# Patient Record
Sex: FEMALE | Race: WHITE | NOT HISPANIC OR LATINO | Employment: UNEMPLOYED | URBAN - METROPOLITAN AREA
[De-identification: names, ages, dates, MRNs, and addresses within clinical notes are randomized per-mention and may not be internally consistent; named-entity substitution may affect disease eponyms.]

---

## 2019-06-29 ENCOUNTER — TRANSFERRED RECORDS (OUTPATIENT)
Dept: HEALTH INFORMATION MANAGEMENT | Facility: CLINIC | Age: 54
End: 2019-06-29

## 2019-07-11 ENCOUNTER — TELEPHONE (OUTPATIENT)
Dept: FAMILY MEDICINE | Facility: OTHER | Age: 54
End: 2019-07-11

## 2019-07-11 NOTE — TELEPHONE ENCOUNTER
She no showed today and has never been seen in our clinic.  She needs an appointment to get medications.     Electronically signed by Fatoumata Andrade CNP.

## 2019-07-11 NOTE — TELEPHONE ENCOUNTER
Patient has appt with you on 7/19 but needs more cyclobenzaprine 10mg bid prn muscle spasms to carry her through.    She has 5 left so #15 would suffice until you see her. Rather than try to track down the [facility] who prescribed it, would you consider giving her another 15?     Les Ag [facility], [facility] [phone]

## 2019-07-19 ENCOUNTER — OFFICE VISIT (OUTPATIENT)
Dept: FAMILY MEDICINE | Facility: OTHER | Age: 54
End: 2019-07-19

## 2019-07-19 VITALS
RESPIRATION RATE: 16 BRPM | TEMPERATURE: 97.7 F | HEART RATE: 82 BPM | DIASTOLIC BLOOD PRESSURE: 66 MMHG | SYSTOLIC BLOOD PRESSURE: 96 MMHG | OXYGEN SATURATION: 97 % | BODY MASS INDEX: 32.07 KG/M2 | HEIGHT: 63 IN | WEIGHT: 181 LBS

## 2019-07-19 DIAGNOSIS — R41.3 MEMORY LOSS: Primary | ICD-10-CM

## 2019-07-19 DIAGNOSIS — M54.9 UPPER BACK PAIN: ICD-10-CM

## 2019-07-19 DIAGNOSIS — R47.01 EXPRESSIVE APHASIA: ICD-10-CM

## 2019-07-19 PROCEDURE — 99204 OFFICE O/P NEW MOD 45 MIN: CPT | Performed by: NURSE PRACTITIONER

## 2019-07-19 ASSESSMENT — MIFFLIN-ST. JEOR: SCORE: 1372.2

## 2019-07-19 ASSESSMENT — PAIN SCALES - GENERAL: PAINLEVEL: NO PAIN (0)

## 2019-07-19 NOTE — PATIENT INSTRUCTIONS
Schedule a physical when you get your insurance set up    See the Neurologist as planned.     Get the records sent to us about your back.     The neurologist appointment:      Stefany Morgan APRN,CNP    Nurse Practitioner    [address] [place] [id number]         Phone: [phone]    Fax: [phone]

## 2019-07-19 NOTE — PROGRESS NOTES
Subjective     Jen Corado is a 54 year old female who presents to clinic today for the following health issues:    HPI       Hospital Follow-up Visit:    Hospital/Nursing Home/IP Rehab Facility: [facility]  Date of Admission: 6/27/19  Date of Discharge: 6/29/19  Reason(s) for Admission: chest pain, forgetful, feeling strange            Problems taking medications regularly:  None       Medication changes since discharge: None       Problems adhering to non-medication therapy:  None    Summary of hospitalization:  CareEverywhere information obtained and reviewed  Diagnostic Tests/Treatments reviewed.  Follow up needed: Neurology  Other Healthcare Providers Involved in Patient s Care:         None  Update since discharge: stable.     Post Discharge Medication Reconciliation: discharge medications reconciled, continue medications without change.  Plan of care communicated with patient     Coding guidelines for this visit:  Type of Medical   Decision Making Face-to-Face Visit       within 7 Days of discharge Face-to-Face Visit        within 14 days of discharge   Moderate Complexity 53511 [id number]   High Complexity [id number] [id number]          Excerpt from Discharge Summary:      Discharge Summaries    Juan Carrillo MD - 06/29/2019 9:51 AM CDT  Formatting of this note might be different from the original.  INPATIENT DISCHARGE SUMMARY  [facility]    Attending Provider: Juan Carrillo MD   Primary Care Physician at Discharge: No Primary Provider None     Admission Date: 06/27/2019  Discharge Date: 06/29/2019  HOSPITAL SUMMARY   Discharge Diagnosis  Chest Pain, ACS ruled out  Dementia vs psychiatric disorder NOS  Nicotine Abuse  Obesity - Body mass index is 32.1 kg/m .     Hospital Course   Ms. Corado is a 53 year old female with a history of nicotine abuse who was admitted to [facility] for some intermittent chest pressure. EKG and troponins were negative for ischemic findings. Cardiology had  been consulted. She was to undergo CTCA, but then got nervous about needing nitroglycerin, and preferred a stress test. Then when talking to her the next day in preparation for her stress test, she was nervous about the radiation. She eventually agreed to undergo CTCA after further discussion. This was negative for any significant disease.     After talking to her and family and her, there is some concern for progressive neurological vs psychiatric disorder. She has been unable to work full time over the prior two years, goes on tangents easily during conversation, has been getting lost while driving, leaving water on at her house, and other concerning signs. Her personality had also changed. Both neurology and psychiatry were consulted here. Initial workup was normal, though she does need ongoing follow-up, including continued work with OT. A driving assessment was recommended in my outpatient OT order as well.     The patient has little insight into there being any issue, and there is a chance she is lost to follow-up, but follow-up was encouraged.     Kind Regards,   Juan Carrillo MD    After this hospitalization, she went on vacation in [place] and ended up being seen in an Urgent Care there.  This was for upper back pain and muscle cramps.  She spends most of the visit talking about this.  Her thought process is quite tangential and it is difficult to get an accurate history.  It sounds like she had imaging done and was prescribed Flexeril.  I do not have access to any of these records.  The Flexeril was helpful and her symptoms have resolved.  However she is very concerned this may happen again and she has several questions about what caused this, why did it happen, ect.     Today she tells me she started having issues at work about 2 months ago.  She attributes this to an unreasonable boss and difficult working situations.  She eventually had to leave her job due to her difficulties.  She attributes  "her recent difficulties with memory to stress related to losing her job.  She denies any other neurological symptoms, but while she was hospitalized, family had raised concerns.  Her  is with her today.  He also attributes most of her issues to her job difficulties.       Has not been to a doctor in many years and she now is working on getting insurance coverage.      Review of Systems   ROS COMP: Constitutional, HEENT, cardiovascular, pulmonary, GI, , musculoskeletal, neuro, skin, endocrine and psych systems are negative, except as otherwise noted.      Objective    BP 96/66   Pulse 82   Temp 97.7  F (36.5  C) (Temporal)   Resp 16   Ht 1.588 m (5' 2.5\")   Wt 82.1 kg (181 lb)   SpO2 97%   BMI 32.58 kg/m    Body mass index is 32.58 kg/m .  Physical Exam   GENERAL: healthy, alert and no distress  EYES: Eyes grossly normal to inspection, PERRL and conjunctivae and sclerae normal  HENT: ear canals and TM's normal, nose and mouth without ulcers or lesions  NECK: no adenopathy, no asymmetry, masses, or scars and thyroid normal to palpation  RESP: lungs clear to auscultation - no rales, rhonchi or wheezes  CV: regular rate and rhythm, normal S1 S2, no S3 or S4, no murmur, click or rub, no peripheral edema and peripheral pulses strong  ABDOMEN: soft, nontender, no hepatosplenomegaly, no masses and bowel sounds normal  MS: no gross musculoskeletal defects noted, no edema  NEURO: Normal strength and tone, mentation intact and speech normal  PSYCH: concentration poor, tangential, anxious and appearance well groomed    Diagnostic Test Results:  none         Assessment & Plan     1. Memory loss  2. Expressive aphasia  She already has a follow up appointment set up with Neurology next month.  Will keep that.  She is very tangential and difficult to follow at times.  I suspect a neurological disorder.         3. Upper back pain  She is very concerned about this, although all her symptoms have resolved now.  I " "advised she get her records sent and we can review them, but unless her symptoms come back it is likely nothing to worry about.         BMI:   Estimated body mass index is 32.58 kg/m  as calculated from the following:    Height as of this encounter: 1.588 m (5' 2.5\").    Weight as of this encounter: 82.1 kg (181 lb).   Weight management plan: Patient was referred to their PCP to discuss a diet and exercise plan.        See Patient Instructions    Return in about 27 days (around 8/15/2019) for Neurology appointment .    KENDELL Trejo [facility]    "

## 2019-08-30 ENCOUNTER — HOSPITAL ENCOUNTER (EMERGENCY)
Facility: CLINIC | Age: 54
Discharge: HOME | End: 2019-08-31
Attending: FAMILY MEDICINE | Admitting: FAMILY MEDICINE

## 2019-08-30 ENCOUNTER — APPOINTMENT (OUTPATIENT)
Dept: GENERAL RADIOLOGY | Facility: CLINIC | Age: 54
End: 2019-08-30
Attending: FAMILY MEDICINE

## 2019-08-30 DIAGNOSIS — R06.00 DYSPNEA, UNSPECIFIED TYPE: ICD-10-CM

## 2019-08-30 DIAGNOSIS — M25.511 ACUTE PAIN OF RIGHT SHOULDER: ICD-10-CM

## 2019-08-30 LAB
ANION GAP SERPL CALCULATED.3IONS-SCNC: 8 MMOL/L (ref 3–14)
BASOPHILS # BLD AUTO: 0 10E9/L (ref 0–0.2)
BASOPHILS NFR BLD AUTO: 0.5 %
BUN SERPL-MCNC: 16 MG/DL (ref 7–30)
CALCIUM SERPL-MCNC: 9.2 MG/DL (ref 8.5–10.1)
CHLORIDE SERPL-SCNC: 109 MMOL/L (ref 94–109)
CO2 SERPL-SCNC: 27 MMOL/L (ref 20–32)
CREAT SERPL-MCNC: 0.81 MG/DL (ref 0.52–1.04)
DIFFERENTIAL METHOD BLD: NORMAL
EOSINOPHIL NFR BLD AUTO: 2.6 %
ERYTHROCYTE [DISTWIDTH] IN BLOOD BY AUTOMATED COUNT: 12.4 % (ref 10–15)
GFR SERPL CREATININE-BSD FRML MDRD: 82 ML/MIN/{1.73_M2}
GLUCOSE SERPL-MCNC: 96 MG/DL (ref 70–99)
HCT VFR BLD AUTO: 43.9 % (ref 35–47)
HGB BLD-MCNC: 14.7 G/DL (ref 11.7–15.7)
IMM GRANULOCYTES # BLD: 0 10E9/L (ref 0–0.4)
IMM GRANULOCYTES NFR BLD: 0.4 %
LYMPHOCYTES # BLD AUTO: 2.2 10E9/L (ref 0.8–5.3)
LYMPHOCYTES NFR BLD AUTO: 28.4 %
MCH RBC QN AUTO: 29.8 PG (ref 26.5–33)
MCHC RBC AUTO-ENTMCNC: 33.5 G/DL (ref 31.5–36.5)
MCV RBC AUTO: 89 FL (ref 78–100)
MONOCYTES # BLD AUTO: 0.5 10E9/L (ref 0–1.3)
MONOCYTES NFR BLD AUTO: 6.2 %
NEUTROPHILS # BLD AUTO: 4.8 10E9/L (ref 1.6–8.3)
NEUTROPHILS NFR BLD AUTO: 61.9 %
NRBC # BLD AUTO: 0 10*3/UL
NRBC BLD AUTO-RTO: 0 /100
PLATELET # BLD AUTO: 181 10E9/L (ref 150–450)
POTASSIUM SERPL-SCNC: 4 MMOL/L (ref 3.4–5.3)
RBC # BLD AUTO: 4.94 10E12/L (ref 3.8–5.2)
SODIUM SERPL-SCNC: 144 MMOL/L (ref 133–144)
WBC # BLD AUTO: 7.8 10E9/L (ref 4–11)

## 2019-08-30 PROCEDURE — 85025 COMPLETE CBC W/AUTO DIFF WBC: CPT | Performed by: FAMILY MEDICINE

## 2019-08-30 PROCEDURE — 99285 EMERGENCY DEPT VISIT HI MDM: CPT | Mod: 25 | Performed by: FAMILY MEDICINE

## 2019-08-30 PROCEDURE — 80048 BASIC METABOLIC PNL TOTAL CA: CPT | Performed by: FAMILY MEDICINE

## 2019-08-30 PROCEDURE — 93005 ELECTROCARDIOGRAM TRACING: CPT | Performed by: FAMILY MEDICINE

## 2019-08-30 PROCEDURE — 71046 X-RAY EXAM CHEST 2 VIEWS: CPT | Mod: TC

## 2019-08-30 PROCEDURE — 93010 ELECTROCARDIOGRAM REPORT: CPT | Mod: Z6 | Performed by: FAMILY MEDICINE

## 2019-08-30 RX ORDER — SODIUM CHLORIDE 9 MG/ML
INJECTION, SOLUTION INTRAVENOUS ONCE
Status: DISCONTINUED | OUTPATIENT
Start: 2019-08-30 | End: 2019-08-31 | Stop reason: HOSPADM

## 2019-08-30 NOTE — ED AVS SNAPSHOT
[facility] Emergency Department  [address] [place] [address]  Phone:  [phone]  Fax:  [phone]                                    Jen Corado   MRN: [id number]    Department:  [facility] Emergency Department   Date of Visit:  8/30/2019           After Visit Summary Signature Page    I have received my discharge instructions, and my questions have been answered. I have discussed any challenges I see with this plan with the nurse or doctor.    ..........................................................................................................................................  Patient/Patient Representative Signature      ..........................................................................................................................................  Patient Representative Print Name and Relationship to Patient    ..................................................               ................................................  Date                                   Time    ..........................................................................................................................................  Reviewed by Signature/Title    ...................................................              ..............................................  Date                                               Time          22EPIC Rev 08/18

## 2019-08-31 VITALS
HEART RATE: 55 BPM | DIASTOLIC BLOOD PRESSURE: 58 MMHG | SYSTOLIC BLOOD PRESSURE: 116 MMHG | OXYGEN SATURATION: 100 % | TEMPERATURE: 97.4 F | RESPIRATION RATE: 16 BRPM

## 2019-08-31 ASSESSMENT — ENCOUNTER SYMPTOMS
FEVER: 0
COUGH: 0
CONFUSION: 1

## 2019-08-31 NOTE — ED TRIAGE NOTES
Pt presented with SOB. 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 Then she states she has been having like muscle spasms, feeling in her chest and throat. Also believes she is dehydrated.

## 2019-08-31 NOTE — DISCHARGE INSTRUCTIONS
Call neurology to schedule an appointment at your earliest convenience as was suggested when you were hospitalized at [place].  You can be seen in the [place] specialty clinic.  They do all of their scheduling through their main office down in [place].   Your chest x-ray was nice and clear, your blood work and EKG were normal and your oxygen levels were perfect tonight.  It does not appear there is any problems with your lungs right now.  You may want to have someone take a closer look at your right shoulder.  Your primary physician could possibly set you up for some physical therapy to begin with.  It was nice visiting with both of you.  I wish you the very best going forward.    Thank you for choosing [facility]. We appreciate the opportunity to meet your urgent medical needs. Please let us know if we could have done anything to make your stay more satisfying.    After discharge, please closely monitor for any new or worsening symptoms. Return to the Emergency Department if you develop any acute worsening signs or symptoms.    If you had lab work, cultures or imaging studies done during your stay, the final results may still be pending. We will call you if your plan of care needs to change. However, if you are not improving as expected, please follow up with your primary care provider or clinic.     Start any prescription medications that were prescribed to you and take them as directed.     Please see additional handouts that may be pertinent to your condition.

## 2019-08-31 NOTE — ED PROVIDER NOTES
"  History     Chief Complaint   Patient presents with     Shortness of Breath     HPI  Jen Corado is a 54 year old female who presents to the ED with complaints about shortness of breath.  States she has been having problems for a couple of months now off and on she will feel crackling in the regions of her shoulders and clavicles and then sometimes up into her neck which she describes as \"crepitus\".  She previously worked as a respiratory therapist.  She has a difficult time completing a thought.  Note I wake up or something.  Or I stand up or what ever.\"  Lots of something.  Her right shoulder will crack at times and she thinks this is the top of her lung.  They went to [facility] to see her sister who is suffering from cancer.  They drove instead of flying because she was worried that she might have a pneumothorax.  Sometimes she has trouble swallowing when she feels this air coming up into her neck.  She was seen in [place] and hospitalized for couple of days working up chest pain.  See in excerpt from that discharge summary below    ================================        Admission Date: 06/27/2019  Discharge Date: 06/29/2019  HOSPITAL SUMMARY   Discharge Diagnosis  Chest Pain, ACS ruled out  Dementia vs psychiatric disorder NOS  Nicotine Abuse  Obesity - Body mass index is 32.1 kg/m .     Hospital Course   Ms. Corado is a 53 year old female with a history of nicotine abuse who [facility]. EKG and troponins were negative for ischemic findings. Cardiology had been consulted. She was to undergo CTCA, but then got nervous about needing nitroglycerin, and preferred a stress test. Then when talking to her the next day in preparation for her stress test, she was nervous about the radiation. She eventually agreed to undergo CTCA after further discussion. This was negative for any significant disease.     After talking to her and family and her, there is some " concern for progressive neurological vs psychiatric disorder. She has been unable to work full time over the prior two years, goes on tangents easily during conversation, has been getting lost while driving, leaving water on at her house, and other concerning signs. Her personality had also changed. Both neurology and psychiatry were consulted here. Initial workup was normal, though she does need ongoing follow-up, including continued work with OT. A driving assessment was recommended in my outpatient OT order as well.     The patient has little insight into there being any issue, and there is a chance she is lost to follow-up, but follow-up was encouraged.    ---------------------------------------------------------------------------------------------    Allergies:  No Known Allergies    Problem List:    There are no active problems to display for this patient.       Past Medical History:    Past Medical History:   Diagnosis Date     Kidney stones        Past Surgical History:    Past Surgical History:   Procedure Laterality Date     C/SECTION, LOW TRANSVERSE       KIDNEY SURGERY      age 18, unsure why, only has 1 kidney left       Family History:    No family history on file.    Social History:  Marital Status:   [2]  Social History     Tobacco Use     Smoking status: Former Smoker     Types: Cigarettes     Last attempt to quit: 2019     Years since quittin.2     Smokeless tobacco: Never Used   Substance Use Topics     Alcohol use: Not Currently     Drug use: Never        Medications:      No current outpatient medications on file.      Review of Systems   Constitutional: Negative for fever.   Respiratory: Negative for cough.    Cardiovascular: Negative for chest pain.   Psychiatric/Behavioral: Positive for confusion.   All other systems reviewed and are negative.      Physical Exam   BP: 132/72  Pulse: 80  Temp: 97.4  F (36.3  C)  Resp: 20  SpO2: 99 %      Physical Exam   Constitutional: She is  "oriented to person, place, and time. She appears well-developed and well-nourished. No distress.   Speaks easily in full paragraphs.  No apparent dyspnea.   HENT:   Mouth/Throat: Oropharynx is clear and moist.   Eyes: EOM are normal.   Neck: Neck supple.   Cardiovascular: Normal rate, regular rhythm and intact distal pulses.   No murmur heard.  Pulmonary/Chest: Effort normal. No respiratory distress. She has no decreased breath sounds. She has no wheezes. She has no rhonchi. She has no rales.   Specifically no crepitus   Abdominal: Soft. There is no tenderness.   Musculoskeletal: She exhibits no edema or tenderness.        Right shoulder: She exhibits decreased range of motion (pain with PROM with abduction and forward flexion). She exhibits no tenderness.   Neurological: She is alert and oriented to person, place, and time.   Skin: Skin is warm and dry.   Psychiatric: Her behavior is normal. Her speech is tangential ( Has difficulty completing ideas.  This is not new.).       ED Course  (with Medical Decision Making)    54-year-old female with perceived shortness of breath speaking in complete sentences without difficulty.  O2 sats are 99% on room air lungs are beautifully clear.  She complains of intermittent sensation of \"crepitus\" in her upper chest and shoulders.  She actually has pain in her right shoulder with passive range of motion with abduction and forward flexion.  Is not necessarily tender to palpation.      Nursing staff placed an IV and lion labs and ordered chest x-ray before I was able to get into see her.  Her chest x-ray is nice and clear.  Her white count is normal with a normal differential.  Basic profile was unremarkable.  We did not do any further cardiac work-up other than repeating an EKG which was normal.  She had a very thorough cardiac work-up done at [place] just over a month ago.  I think her underlying issues are either neurological and/or psychiatric.    She would rather see " neurology in [place] rather than going back over to [place].  I gave her contact information to call the [facility], [facility] to schedule an appointment.    She can follow-up with her primary physician in regards to her shoulder pain.  Physical therapy would be a reasonable place to start.          Procedures                 EKG Interpretation:      Interpreted by Cheng Ayala MD  Time reviewed:2335   Symptoms at time of EKG: dyspnea   Rhythm: Sinus bradycardia   Rate: 58  Axis: NORMAL  Ectopy: none  Conduction: normal  ST Segments/ T Waves: No ST-T wave changes  Q Waves: none  Comparison to prior: No old EKG available    Clinical Impression: normal EKG              Critical Care time:  none               Results for orders placed or performed during the hospital encounter of 08/30/19 (from the past 24 hour(s))   CBC with platelets differential   Result Value Ref Range    WBC 7.8 4.0 - 11.0 10e9/L    RBC Count 4.94 3.8 - 5.2 10e12/L    Hemoglobin 14.7 11.7 - 15.7 g/dL    Hematocrit 43.9 35.0 - 47.0 %    MCV 89 78 - 100 fl    MCH 29.8 26.5 - 33.0 pg    MCHC 33.5 31.5 - 36.5 g/dL    RDW 12.4 10.0 - 15.0 %    Platelet Count 181 150 - 450 10e9/L    Diff Method Automated Method     % Neutrophils 61.9 %    % Lymphocytes 28.4 %    % Monocytes 6.2 %    % Eosinophils 2.6 %    % Basophils 0.5 %    % Immature Granulocytes 0.4 %    Nucleated RBCs 0 0 /100    Absolute Neutrophil 4.8 1.6 - 8.3 10e9/L    Absolute Lymphocytes 2.2 0.8 - 5.3 10e9/L    Absolute Monocytes 0.5 0.0 - 1.3 10e9/L    Absolute Basophils 0.0 0.0 - 0.2 10e9/L    Abs Immature Granulocytes 0.0 0 - 0.4 10e9/L    Absolute Nucleated RBC 0.0    Basic metabolic panel   Result Value Ref Range    Sodium 144 133 - 144 mmol/L    Potassium 4.0 3.4 - 5.3 mmol/L    Chloride 109 94 - 109 mmol/L    Carbon Dioxide 27 20 - 32 mmol/L    Anion Gap 8 3 - 14 mmol/L    Glucose 96 70 - 99 mg/dL    Urea Nitrogen 16 7 - 30 mg/dL    Creatinine 0.81 0.52 -  1.04 mg/dL    GFR Estimate 82 >60 mL/min/[1.73_m2]    GFR Estimate If Black >90 >60 mL/min/[1.73_m2]    Calcium 9.2 8.5 - 10.1 mg/dL   XR Chest 2 Views    Narrative    XR CHEST 2 VIEWS   8/30/2019 11:47 PM     INDICATION: Shortness of breath.    COMPARISON: None.      Impression    IMPRESSION: No infiltrates or other acute findings. Heart size is  within normal limits.    HARDIK SANTOS MD       Medications   sodium chloride 0.9% infusion (has no administration in time range)       Assessments & Plan     I have reviewed the nursing notes.    I have reviewed the findings, diagnosis, plan and need for follow up with the patient.       There are no discharge medications for this patient.      Final diagnoses:   Dyspnea, unspecified type   Acute pain of right shoulder       8/30/2019   [facility] EMERGENCY DEPARTMENT     Cheng Ayala MD  08/31/19 0116

## 2019-09-12 ENCOUNTER — MYC MEDICAL ADVICE (OUTPATIENT)
Dept: FAMILY MEDICINE | Facility: OTHER | Age: 54
End: 2019-09-12

## 2019-11-13 ENCOUNTER — HOSPITAL ENCOUNTER (EMERGENCY)
Facility: CLINIC | Age: 54
Discharge: HOME | End: 2019-11-13
Attending: EMERGENCY MEDICINE | Admitting: EMERGENCY MEDICINE

## 2019-11-13 VITALS
TEMPERATURE: 98 F | OXYGEN SATURATION: 97 % | BODY MASS INDEX: 30.48 KG/M2 | DIASTOLIC BLOOD PRESSURE: 84 MMHG | HEART RATE: 62 BPM | HEIGHT: 63 IN | RESPIRATION RATE: 19 BRPM | SYSTOLIC BLOOD PRESSURE: 151 MMHG | WEIGHT: 172 LBS

## 2019-11-13 DIAGNOSIS — R13.10 DYSPHAGIA, UNSPECIFIED TYPE: ICD-10-CM

## 2019-11-13 DIAGNOSIS — R07.9 CHEST PAIN, UNSPECIFIED TYPE: ICD-10-CM

## 2019-11-13 DIAGNOSIS — R47.02 DYSPHASIA: ICD-10-CM

## 2019-11-13 DIAGNOSIS — R47.89 WORD FINDING DIFFICULTY: ICD-10-CM

## 2019-11-13 DIAGNOSIS — M54.2 NECK PAIN: ICD-10-CM

## 2019-11-13 PROCEDURE — 25000132 ZZH RX MED GY IP 250 OP 250 PS 637: Performed by: EMERGENCY MEDICINE

## 2019-11-13 PROCEDURE — 99283 EMERGENCY DEPT VISIT LOW MDM: CPT | Performed by: EMERGENCY MEDICINE

## 2019-11-13 PROCEDURE — 99284 EMERGENCY DEPT VISIT MOD MDM: CPT | Mod: Z6 | Performed by: EMERGENCY MEDICINE

## 2019-11-13 RX ORDER — CYCLOBENZAPRINE HCL 10 MG
10 TABLET ORAL ONCE
Status: COMPLETED | OUTPATIENT
Start: 2019-11-13 | End: 2019-11-13

## 2019-11-13 RX ORDER — CYCLOBENZAPRINE HCL 10 MG
5-10 TABLET ORAL 3 TIMES DAILY PRN
Qty: 30 TABLET | Refills: 0 | Status: SHIPPED | OUTPATIENT
Start: 2019-11-13 | End: 2019-12-23

## 2019-11-13 RX ORDER — DIAZEPAM 5 MG
TABLET ORAL
Qty: 2 TABLET | Refills: 0 | Status: SHIPPED | OUTPATIENT
Start: 2019-11-13 | End: 2020-04-13

## 2019-11-13 RX ADMIN — CYCLOBENZAPRINE HYDROCHLORIDE 10 MG: 10 TABLET, FILM COATED ORAL at 23:30

## 2019-11-13 ASSESSMENT — MIFFLIN-ST. JEOR: SCORE: 1339.32

## 2019-11-14 ENCOUNTER — HOSPITAL ENCOUNTER (OUTPATIENT)
Dept: MRI IMAGING | Facility: CLINIC | Age: 54
End: 2019-11-14
Attending: EMERGENCY MEDICINE

## 2019-11-14 ENCOUNTER — HOSPITAL ENCOUNTER (OUTPATIENT)
Dept: MRI IMAGING | Facility: CLINIC | Age: 54
Discharge: HOME | End: 2019-11-14
Attending: EMERGENCY MEDICINE | Admitting: EMERGENCY MEDICINE

## 2019-11-14 DIAGNOSIS — R47.02 DYSPHASIA: ICD-10-CM

## 2019-11-14 DIAGNOSIS — R13.10 DYSPHAGIA, UNSPECIFIED TYPE: ICD-10-CM

## 2019-11-14 DIAGNOSIS — R47.89 WORD FINDING DIFFICULTY: ICD-10-CM

## 2019-11-14 DIAGNOSIS — M54.2 NECK PAIN: ICD-10-CM

## 2019-11-14 PROCEDURE — 70544 MR ANGIOGRAPHY HEAD W/O DYE: CPT

## 2019-11-14 PROCEDURE — 70549 MR ANGIOGRAPH NECK W/O&W/DYE: CPT

## 2019-11-14 PROCEDURE — 72141 MRI NECK SPINE W/O DYE: CPT

## 2019-11-14 PROCEDURE — 25000125 ZZHC RX 250: Performed by: EMERGENCY MEDICINE

## 2019-11-14 PROCEDURE — A9585 GADOBUTROL INJECTION: HCPCS | Performed by: EMERGENCY MEDICINE

## 2019-11-14 PROCEDURE — 70553 MRI BRAIN STEM W/O & W/DYE: CPT

## 2019-11-14 PROCEDURE — 25500064 ZZH RX 255 OP 636: Performed by: EMERGENCY MEDICINE

## 2019-11-14 RX ORDER — GADOBUTROL 604.72 MG/ML
7.5 INJECTION INTRAVENOUS ONCE
Status: COMPLETED | OUTPATIENT
Start: 2019-11-14 | End: 2019-11-14

## 2019-11-14 RX ADMIN — SODIUM CHLORIDE 30 ML: 9 INJECTION, SOLUTION INTRAVENOUS at 14:41

## 2019-11-14 RX ADMIN — GADOBUTROL 7.5 ML: 604.72 INJECTION INTRAVENOUS at 14:40

## 2019-11-14 NOTE — ED TRIAGE NOTES
Patient is very anxious with talking, bringing up symptoms from 25 years ago.  Has multiple complaints tonight.  Complains of chest heaviness/tightness, neck pain, and states she is unable to swallow intermittently.  Has been in and had a couple cardiac work up with no answers.  Couple of those visits have been in [facility].

## 2019-11-14 NOTE — ED PROVIDER NOTES
"  History     Chief Complaint   Patient presents with     Anxiety     HPI  History per patient and medical records    This is a 54-year-old female presenting with anxiety.  Patient is a very difficult historian.  Apparently, she she feels that she was in her baseline, basically normal state of health until Eleni 3, 2019.  At that time, she quit her job because of stress and when she describes as \"her mind all tied up\".  She acknowledges some difficulty with word finding, some confusion, forgetfulness.  She started having episodes of chest pain.  She describes the sensation of 2 bricks lying on her upper chest and shoulder area.  She was seen in [facility] in late June, 2019 and had a cardiac rule out with normal EKG and troponins.  She had a CT cardiac angiogram done that did not show any significant disease.  She was worked up/evaluated inpatient and was thought to potentially have a neurologic versus psychiatric disorder.  Please see below.    She did not have any primary care for a number of years and establish care 7/19/2019 with Fatoumata Andrade.  At the time, she was noted to have expressive aphasia and apparently had neurology follow-up.  This apparently was not accomplished.    Patient's  is here with her.  They both acknowledge that she will have good days and bad days.  There is some question on neck pain being the cause of many of her symptoms, especially the chest pain symptoms.  He states that she had acute onset of pain when she was bending over washing her hair and reach for some shampoo.  She notes that she is not usually an anxious person but when the pain occurs it significantly increases her anxiety.    Patient states that she worked as a respiratory therapist for many years but needed to quit her job because of some unreasonable coworkers/boss.  Her  also attributes a lot of her issues to her previous job where she worked 12-hour shifts and drove at least an hour each way.    Patient " specifically came to the ED today because she felt the sensation of choking or difficulty swallowing.  She was finally able to dislodge some kind of phlegm and notes that this seems to be improved.  This again made her quite anxious and panicked.    Of note, most of this history is received in fits and pieces as patient is incredibly tangential and has some expressive aphasia.      Discharge Date: 06/29/2019  HOSPITAL SUMMARY   Discharge Diagnosis  Chest Pain, ACS ruled out  Dementia vs psychiatric disorder NOS  Nicotine Abuse  Obesity - Body mass index is 32.1 kg/m .     Hospital Course   Ms. Corado is a 53 year old female with a history of nicotine abuse who was admitted to [facility] for some intermittent chest pressure. EKG and troponins were negative for ischemic findings. Cardiology had been consulted. She was to undergo CTCA, but then got nervous about needing nitroglycerin, and preferred a stress test. Then when talking to her the next day in preparation for her stress test, she was nervous about the radiation. She eventually agreed to undergo CTCA after further discussion. This was negative for any significant disease.     After talking to her and family and her, there is some concern for progressive neurological vs psychiatric disorder. She has been unable to work full time over the prior two years, goes on tangents easily during conversation, has been getting lost while driving, leaving water on at her house, and other concerning signs. Her personality had also changed. Both neurology and psychiatry were consulted here. Initial workup was normal, though she does need ongoing follow-up, including continued work with OT. A driving assessment was recommended in my outpatient OT order as well.     The patient has little insight into there being any issue, and there is a chance she is lost to follow-up, but follow-up was encouraged.         Allergies:  No Known Allergies    Problem List:    There are no active  "problems to display for this patient.       Past Medical History:    Past Medical History:   Diagnosis Date     Kidney stones        Past Surgical History:    Past Surgical History:   Procedure Laterality Date     C/SECTION, LOW TRANSVERSE       KIDNEY SURGERY      age 18, unsure why, only has 1 kidney left       Family History:    History reviewed. No pertinent family history.    Social History:  Marital Status:   [2]  Social History     Tobacco Use     Smoking status: Former Smoker     Types: Cigarettes     Last attempt to quit: 2019     Years since quittin.4     Smokeless tobacco: Never Used   Substance Use Topics     Alcohol use: Not Currently     Drug use: Never        Medications:    cyclobenzaprine (FLEXERIL) 10 MG tablet  diazepam (VALIUM) 5 MG tablet          Review of Systems   All other ROS reviewed and are negative or non-contributory except as stated in HPI.     Physical Exam   BP: (!) 151/84  Pulse: 62  Temp: 98  F (36.7  C)  Resp: 19  Height: 160 cm (5' 3\")  Weight: 78 kg (172 lb)  SpO2: 97 %      Physical Exam  Vitals signs and nursing note reviewed.   Constitutional:       Appearance: Normal appearance. She is normal weight.   HENT:      Head: Normocephalic.      Right Ear: Tympanic membrane normal.      Left Ear: Tympanic membrane normal.      Nose: Nose normal.      Mouth/Throat:      Mouth: Mucous membranes are moist.      Pharynx: Oropharynx is clear.      Comments: Oropharynx is clear.  Patient is handling her secretions well.  No posterior pharyngeal drainage  Eyes:      Extraocular Movements: Extraocular movements intact.      Conjunctiva/sclera: Conjunctivae normal.      Pupils: Pupils are equal, round, and reactive to light.   Neck:      Musculoskeletal: Normal range of motion and neck supple.   Cardiovascular:      Rate and Rhythm: Normal rate and regular rhythm.      Pulses: Normal pulses.      Heart sounds: Normal heart sounds.   Pulmonary:      Effort: Pulmonary effort is " normal.      Breath sounds: Normal breath sounds.   Abdominal:      Palpations: Abdomen is soft.      Tenderness: There is no abdominal tenderness.   Musculoskeletal: Normal range of motion.   Skin:     General: Skin is warm and dry.   Neurological:      Mental Status: She is alert.      Comments: No obvious cranial nerve deficits.  No tremor.  Normal ambulation.  Significant word finding/dysphasia issues.  Intermittently tearful and very tangential in speech.   Psychiatric:      Comments: Intermittently tearful, very anxious.  Tangential speech.         ED Course (with Medical Decision Making)    Pt seen and examined by me.  RN and EPIC notes reviewed.      Patient with concerns about swallowing difficulty, chest pain, also with a lot of anxiety, confusion, word finding difficulties.  I am wondering if she may have had a stroke a number of months ago or if she has a progressive neurologic disorder/early dementia.  She did have a head CT done at an outside hospital.  This did not show any acute findings.  I think she needs an MRI scan.    With regards to her pain in her chest, this may be coming from the neck also.  She had a CT of the neck without significant abnormalities.  I am going to order this as an MRI also.    I think all of this can be done as an outpatient.  She is going to need close follow-up with neurology.  She was given Flexeril in the ED for the muscle spasm and she can take this at home if she needs.  She was given an Rx for Valium pre-MRI scans.  Hopefully she will be able to get this in the next day or 2 and follow-up in clinic.  I will send a neurology consult.        Procedures    No results found for this or any previous visit (from the past 24 hour(s)).    Medications   cyclobenzaprine (FLEXERIL) tablet 10 mg (10 mg Oral Given 11/13/19 8490)       Assessments & Plan      I have reviewed the findings, diagnosis, plan and need for follow up with the patient.    Discharge Medication List as of  11/13/2019 11:24 PM      START taking these medications    Details   cyclobenzaprine (FLEXERIL) 10 MG tablet Take 0.5-1 tablets (5-10 mg) by mouth 3 times daily as needed for muscle spasms, Disp-30 tablet, R-0, E-Prescribe      diazepam (VALIUM) 5 MG tablet Take 5 mg 1 hour prior to your MRI scan and a second 5 mg 30 minutes before the MRI scan if needed for anxiety, Disp-2 tablet, R-0, Local Print             Final diagnoses:   Neck pain   Chest pain, unspecified type   Word finding difficulty   Dysphagia, unspecified type   Dysphasia     Disposition: Patient discharged home in stable condition.  Plan as above.  Return for concerns.     Note: Chart documentation done in part with Dragon Voice Recognition software. Although reviewed after completion, some word and grammatical errors may remain.     11/13/2019   [facility] EMERGENCY DEPARTMENT     Sierra Gamez MD  11/14/19 9188

## 2019-11-14 NOTE — DISCHARGE INSTRUCTIONS
I have placed orders for MRI scan.  We will call you in the morning to help set this up.    I also will give you a prescription for Valium to use as needed prior to the MRI scan for anxiety.  This is very common to use prior to MRI.    Flexeril if needed for muscle spasm in the neck.    Return at any time for concerns.    I hope that we can get to the bottom of some of your symptoms!!

## 2019-11-20 ENCOUNTER — TELEPHONE (OUTPATIENT)
Dept: FAMILY MEDICINE | Facility: OTHER | Age: 54
End: 2019-11-20

## 2019-11-20 ENCOUNTER — NURSE TRIAGE (OUTPATIENT)
Dept: NURSING | Facility: CLINIC | Age: 54
End: 2019-11-20

## 2019-11-20 DIAGNOSIS — R47.01 EXPRESSIVE APHASIA: Primary | ICD-10-CM

## 2019-11-20 NOTE — TELEPHONE ENCOUNTER
Jen calls asking for a call back from Fatoumata Andrade with results from her MRI /MRA done 11/14/19.

## 2019-11-20 NOTE — TELEPHONE ENCOUNTER
Jen is calling to get MR results.  FNA relayed results and advised Jen to contact primary MD Fatoumata Andrade and patient agreed.

## 2019-11-20 NOTE — TELEPHONE ENCOUNTER
Pt advised of below message and agreeable to plan. Referral pended for provider to diagnose and sign. Pt given information for scheduling    Lori Posadas CMA

## 2019-11-20 NOTE — TELEPHONE ENCOUNTER
----- Message from KENDELL Trejo CNP sent at 11/20/2019  8:09 AM CST -----  Please follow up and make sure this was scheduled.     Electronically signed by Fatoumata Andrade CNP.  ----- Message -----  From: Sierra Gamez MD  Sent: 11/14/2019   4:37 PM CST  To: KENDELL Trejo CNP    Hi,    This poor lady.  She needs a STAT referral for neurology.  I sent one and also ordered brain and neck MRIs.  I think her sx are dementia related as I don't see any acute pathology on the MRIs.  So sad.    Can you please follow up and make sure she gets an appointment ASAP?  Maybe a full dementia work up?    Thank you!!

## 2019-11-20 NOTE — TELEPHONE ENCOUNTER
Her neck MRI showed:     IMPRESSION:   1. C5-C6 moderate degenerative disc disease and mild spinal canal  stenosis.  2. C6-C7 small left posterior paramedian disc protrusion and radial  annular fissure.  3. C3-C4 degenerative facet arthropathy bilaterally.     Her brain MRI showed:     IMPRESSION:  1. No evidence of acute infarct, hemorrhage or mass.  2. Scattered nonspecific white matter lesions in the frontal lobes.      The white matter lesions are not specific, but could be contributing to her memory loss.  Her neck pain could be from the degenerative disc disease, stenosis and disc protrusion.      She needs to follow up with Neurology as planned.     Electronically signed by Fatoumata Andrade CNP.

## 2019-11-21 NOTE — TELEPHONE ENCOUNTER
Referral information refaxed to [facility]. Pt informed  Lori Posadas, YANET

## 2019-11-21 NOTE — TELEPHONE ENCOUNTER
Pt stated Neurology didn't receive the referral. Please refax and call pt when OK for her to call Neuro back to [facility] an appt.  Pt can be reached at  [phone].

## 2019-12-18 ENCOUNTER — TELEPHONE (OUTPATIENT)
Dept: FAMILY MEDICINE | Facility: OTHER | Age: 54
End: 2019-12-18

## 2019-12-18 NOTE — TELEPHONE ENCOUNTER
Panel Management Review      Patient has the following on her problem list: None      Composite cancer screening  Chart review shows that this patient is due/due soon for the following Pap Smear, Mammogram and Colonoscopy  Summary:    Patient is due/failing the following:   COLONOSCOPY, LDL, MAMMOGRAM, PAP and PHYSICAL    Action needed:   Patient needs office visit for Physical with pap/hpv and fasting labs. and Patient needs referral/order: Mammogram and Colonoscopy.     Type of outreach:    Sent letter.    Questions for provider review:    None                                                                                                                                    Gertrudis Hebert MA

## 2019-12-18 NOTE — LETTER
[facility]  [address] [place] [facility] [id number] [phone]        Jen Corado  [address] [facility] [address]      December 18, 2019      Dear Jen,    I care about your health and have reviewed your health plan, including your medical conditions, medication list, and lab results and am making recommendations based on this review, to better manage your health.    You are in particular need of attention regarding:  -Breast Cancer Screening  -Colon Cancer Screening  -Cervical Cancer Screening  -Wellness (Physical) Visit     I am recommending that you:  -schedule a WELLNESS (Physical) APPOINTMENT with me.   I will check fasting labs the same day - nothing to eat except water and meds for 8-10 hours prior.  -schedule a MAMMOGRAM which is due. You can call  [phone] to schedule your mammogram.    -schedule a PAP SMEAR EXAM. This is a screening test done during a pelvic exam to check for abnormal changes in the cells of the cervix.  Cervical cancer is preventable and curable if abnormal cells are detected and treated early. You can call your clinic at the number listed above to schedule your Pap Smear.    -schedule a COLONOSCOPY.  Colon cancer is now the second leading cause of cancer-related deaths in the [place] for both men and women.  There are over 130,000 new cases and 50,000 deaths per year from colon cancer.  A recent study, which included patients ages 55 to 79 found 50,400 [place] deaths from colorectal cancer could have been prevented if patients had undergone a colonoscopy in the previous 10 years.    If you have not had a colonoscopy, we encourage you to schedule by contacting us at [phone], Monday through Friday.  After hours, you may leave a message and we will return your call during normal business hours.      There is another option called a FIT test, if you don t wish to have a colonoscopy, which needs to be repeated every year.  It does  replace the colonoscopy for colorectal cancer screening and can detect hidden bleeding in the lower colon.  If a positive result is obtained, you would be referred for a colonoscopy. Please discuss this option with your provider.      For patients under/uninsured, we recommend you contact the [employer] program. [employer] is a free colorectal cancer screening program that provides colonoscopies for eligible under/uninsured [place] men and women. If you are interested in receiving a free colonoscopy, please call [employer] at [phone] (mention code [employer]) to see if you re eligible.     If you've had the preventative screening completed at another facility or feel you're not due for this screening, please call our clinic at the number listed above or send us a My Chart message so we can update our records. We would like to thank you in advance for taking the time to take care of your health.  If you have any questions, please don t hesitate to contact our clinic.    Sincerely,       [facility]

## 2019-12-23 ENCOUNTER — TELEPHONE (OUTPATIENT)
Dept: NURSING | Facility: CLINIC | Age: 54
End: 2019-12-23

## 2019-12-23 ENCOUNTER — TELEPHONE (OUTPATIENT)
Dept: FAMILY MEDICINE | Facility: OTHER | Age: 54
End: 2019-12-23

## 2019-12-23 ENCOUNTER — NURSE TRIAGE (OUTPATIENT)
Dept: NURSING | Facility: CLINIC | Age: 54
End: 2019-12-23

## 2019-12-23 DIAGNOSIS — M54.9 UPPER BACK PAIN: Primary | ICD-10-CM

## 2019-12-23 RX ORDER — CYCLOBENZAPRINE HCL 10 MG
5-10 TABLET ORAL 3 TIMES DAILY PRN
Qty: 30 TABLET | Refills: 0 | Status: SHIPPED | OUTPATIENT
Start: 2019-12-23 | End: 2020-04-13

## 2019-12-23 NOTE — TELEPHONE ENCOUNTER
Reason for Call:  Medication or medication refill:    Do you use a [facility]?  Name of the pharmacy and phone number for the current request:  [facility] [place]- [phone]    Name of the medication requested: Flexeril     Other request: pt is out     Can we leave a detailed message on this number? YES    Phone number patient can be reached at: Home number on file [phone] (home)    Best Time: any     Call taken on 12/23/2019 at 12:36 PM by Stephanie Mccartney

## 2019-12-23 NOTE — TELEPHONE ENCOUNTER
Requested Prescriptions   Pending Prescriptions Disp Refills     cyclobenzaprine (FLEXERIL) 10 MG tablet 30 tablet 0     Sig: Take 0.5-1 tablets (5-10 mg) by mouth 3 times daily as needed for muscle spasms     Last Written Prescription Date:  11/13/2019  Last Fill Quantity: 30,   # refills: 0  Last Office Visit: 07/19/2019  Future Office visit:       Routing refill request to provider for review/approval because:  Drug not on the [facility], P or [facility] refill protocol or controlled substance.     Routed to pcp to sign.     Gertrudis Hebert MA

## 2019-12-24 RX ORDER — DIAZEPAM 5 MG
TABLET ORAL
Qty: 2 TABLET | Refills: 0 | OUTPATIENT
Start: 2019-12-24

## 2019-12-24 NOTE — TELEPHONE ENCOUNTER
Checked in current medications shows that Fatoumata Andrade signed script for Flexeril 10 mg 30 tablets on 12/23/2019 sent script to [facility].     Called and LM for patient to call back. Please inform her of message above. Also tell her the Fatoumata Andrade is out of office until 1/02/2020.     Gertrudis Hebert MA
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
## 2019-12-24 NOTE — TELEPHONE ENCOUNTER
Valium 5MG     Last Written Prescription Date:  11/13/2019  Last Fill Quantity: 2,   # refills: 0  Last Office Visit: 07/09/2019  Future Office visit:       Routing refill request to provider for review/approval because:  Drug not on the [facility], P or [facility] refill protocol or controlled substance.     Routed to covering MD to sign.     Gertrudis Hebert MA

## 2019-12-24 NOTE — TELEPHONE ENCOUNTER
Reason for Call:  Other call back    Detailed comments: Patient is asking for  diazepam (VALIUM) 5 MG tablet 1 or 2 pills to help her sleep do to family coming over.    Phone Number Patient can be reached at: Home number on file [phone] (home)    Best Time: Any time    Can we leave a detailed message on this number? YES    Call taken on 12/23/2019 at 6:28 PM by Miranda Seipiel Vaccari
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
## 2019-12-24 NOTE — TELEPHONE ENCOUNTER
Routing to covering provider to advise. Fatoumata Andrade is out of office until 01/20/2020.     Gertrudis Hebert MA

## 2020-01-14 ENCOUNTER — TELEPHONE (OUTPATIENT)
Dept: FAMILY MEDICINE | Facility: OTHER | Age: 55
End: 2020-01-14

## 2020-01-14 NOTE — TELEPHONE ENCOUNTER
Patient calling on a conference call with her daughter (Marya Haley), requested MRI results and those were given, also talked about the referral to Neurology and patient stated that she had tried to schedule this but insurance was an issue.  I gave them the information for [facility] and they will call there to make an appointment. I am going to fax information over to [facility], as I am not sure that they received the initial referral.    Theodora Espinoza XRO/

## 2020-02-05 ENCOUNTER — TRANSFERRED RECORDS (OUTPATIENT)
Dept: HEALTH INFORMATION MANAGEMENT | Facility: CLINIC | Age: 55
End: 2020-02-05

## 2020-02-13 ENCOUNTER — TRANSFERRED RECORDS (OUTPATIENT)
Dept: HEALTH INFORMATION MANAGEMENT | Facility: CLINIC | Age: 55
End: 2020-02-13

## 2020-02-27 ENCOUNTER — TRANSFERRED RECORDS (OUTPATIENT)
Dept: HEALTH INFORMATION MANAGEMENT | Facility: CLINIC | Age: 55
End: 2020-02-27

## 2020-03-24 DIAGNOSIS — G31.84 MILD COGNITIVE IMPAIRMENT: Primary | ICD-10-CM

## 2020-03-24 DIAGNOSIS — R41.3 MEMORY LOSS: ICD-10-CM

## 2020-03-24 DIAGNOSIS — E55.9 AVITAMINOSIS D: ICD-10-CM

## 2020-03-24 DIAGNOSIS — E55.9 VITAMIN D DEFICIENCY: ICD-10-CM

## 2020-03-26 DIAGNOSIS — R41.3 MEMORY LOSS: ICD-10-CM

## 2020-03-26 DIAGNOSIS — G31.84 MILD COGNITIVE IMPAIRMENT: Primary | ICD-10-CM

## 2020-03-26 DIAGNOSIS — E55.9 VITAMIN D DEFICIENCY: ICD-10-CM

## 2020-04-13 ENCOUNTER — VIRTUAL VISIT (OUTPATIENT)
Dept: FAMILY MEDICINE | Facility: CLINIC | Age: 55
End: 2020-04-13
Payer: COMMERCIAL

## 2020-04-13 DIAGNOSIS — M54.50 CHRONIC BILATERAL LOW BACK PAIN WITHOUT SCIATICA: Primary | ICD-10-CM

## 2020-04-13 DIAGNOSIS — G89.29 CHRONIC BILATERAL LOW BACK PAIN WITHOUT SCIATICA: Primary | ICD-10-CM

## 2020-04-13 PROBLEM — R47.89 WORD FINDING DIFFICULTY: Status: ACTIVE | Noted: 2019-06-28

## 2020-04-13 PROBLEM — R41.3 MEMORY LOSS: Status: ACTIVE | Noted: 2019-06-28

## 2020-04-13 PROCEDURE — 99441 ZZC PHYSICIAN TELEPHONE EVALUATION 5-10 MIN: CPT | Mod: 95 | Performed by: FAMILY MEDICINE

## 2020-04-13 RX ORDER — CYCLOBENZAPRINE HCL 10 MG
10 TABLET ORAL
Qty: 15 TABLET | Refills: 1 | Status: SHIPPED | OUTPATIENT
Start: 2020-04-13

## 2020-04-13 RX ORDER — NAPROXEN 500 MG/1
500 TABLET ORAL 2 TIMES DAILY PRN
Qty: 60 TABLET | Refills: 0 | Status: SHIPPED | OUTPATIENT
Start: 2020-04-13
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
## 2020-05-15 ENCOUNTER — HOSPITAL ENCOUNTER (EMERGENCY)
Facility: CLINIC | Age: 55
Discharge: HOME | End: 2020-05-15
Attending: EMERGENCY MEDICINE | Admitting: EMERGENCY MEDICINE
Payer: COMMERCIAL

## 2020-05-15 VITALS
DIASTOLIC BLOOD PRESSURE: 78 MMHG | SYSTOLIC BLOOD PRESSURE: 118 MMHG | TEMPERATURE: 98.1 F | RESPIRATION RATE: 18 BRPM | OXYGEN SATURATION: 97 % | HEART RATE: 73 BPM

## 2020-05-15 DIAGNOSIS — G89.29 CHRONIC BILATERAL LOW BACK PAIN WITHOUT SCIATICA: ICD-10-CM

## 2020-05-15 DIAGNOSIS — M54.50 CHRONIC BILATERAL LOW BACK PAIN WITHOUT SCIATICA: ICD-10-CM

## 2020-05-15 DIAGNOSIS — M54.9 UPPER BACK PAIN: ICD-10-CM

## 2020-05-15 PROCEDURE — 99282 EMERGENCY DEPT VISIT SF MDM: CPT | Performed by: EMERGENCY MEDICINE

## 2020-05-15 PROCEDURE — 99284 EMERGENCY DEPT VISIT MOD MDM: CPT | Mod: Z6 | Performed by: EMERGENCY MEDICINE

## 2020-05-15 NOTE — DISCHARGE INSTRUCTIONS
Return to the ER if you develop new or worsening symptoms.  Take the pain medicines as prescribed.  Do not drive or operate heavy machinery if taking the pain medicines.  Please follow-up with your primary care provider if your pain does not improve.

## 2020-05-15 NOTE — ED AVS SNAPSHOT
[facility] Emergency Department  [address] [place] [address]  Phone:  [phone]  Fax:  [phone]                                    Jen Corado   MRN: [id number]    Department:  [facility] Emergency Department   Date of Visit:  5/15/2020           After Visit Summary Signature Page    I have received my discharge instructions, and my questions have been answered. I have discussed any challenges I see with this plan with the nurse or doctor.    ..........................................................................................................................................  Patient/Patient Representative Signature      ..........................................................................................................................................  Patient Representative Print Name and Relationship to Patient    ..................................................               ................................................  Date                                   Time    ..........................................................................................................................................  Reviewed by Signature/Title    ...................................................              ..............................................  Date                                               Time          22EPIC Rev 08/18
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
## 2020-07-23 DIAGNOSIS — G31.84 MILD COGNITIVE IMPAIRMENT, SO STATED: Primary | ICD-10-CM

## 2020-07-23 DIAGNOSIS — R41.3 MEMORY LOSS: ICD-10-CM

## 2020-07-23 DIAGNOSIS — E55.9 AVITAMINOSIS D: ICD-10-CM

## 2020-08-03 ENCOUNTER — HOSPITAL ENCOUNTER (EMERGENCY)
Facility: CLINIC | Age: 55
End: 2020-08-03
Payer: COMMERCIAL

## 2021-05-30 ENCOUNTER — HEALTH MAINTENANCE LETTER (OUTPATIENT)
Age: 56
End: 2021-05-30

## 2021-09-19 ENCOUNTER — HEALTH MAINTENANCE LETTER (OUTPATIENT)
Age: 56
End: 2021-09-19

## 2022-06-25 ENCOUNTER — HEALTH MAINTENANCE LETTER (OUTPATIENT)
Age: 57
End: 2022-06-25

## 2022-11-20 ENCOUNTER — HEALTH MAINTENANCE LETTER (OUTPATIENT)
Age: 57
End: 2022-11-20

## 2023-07-08 ENCOUNTER — HEALTH MAINTENANCE LETTER (OUTPATIENT)
Age: 58
End: 2023-07-08

## 2025-08-13 ENCOUNTER — DOCUMENTATION ONLY (OUTPATIENT)
Dept: OTHER | Facility: CLINIC | Age: 60
End: 2025-08-13
Payer: COMMERCIAL

## 2025-08-13 ENCOUNTER — DOCUMENTATION ONLY (OUTPATIENT)
Dept: GERIATRICS | Facility: CLINIC | Age: 60
End: 2025-08-13
Payer: COMMERCIAL

## 2025-08-14 ENCOUNTER — ASSISTED LIVING VISIT (OUTPATIENT)
Dept: GERIATRICS | Facility: CLINIC | Age: 60
End: 2025-08-14
Payer: COMMERCIAL

## 2025-08-14 VITALS
SYSTOLIC BLOOD PRESSURE: 112 MMHG | HEART RATE: 57 BPM | WEIGHT: 151 LBS | TEMPERATURE: 97.8 F | HEIGHT: 63 IN | RESPIRATION RATE: 18 BRPM | DIASTOLIC BLOOD PRESSURE: 61 MMHG | BODY MASS INDEX: 26.75 KG/M2

## 2025-08-14 DIAGNOSIS — F41.1 GAD (GENERALIZED ANXIETY DISORDER): ICD-10-CM

## 2025-08-14 DIAGNOSIS — F99 INSOMNIA DUE TO OTHER MENTAL DISORDER: ICD-10-CM

## 2025-08-14 DIAGNOSIS — F11.250 OPIOID DEPENDENCE WITH OPIOID-INDUCED PSYCHOTIC DISORDER WITH DELUSIONS (H): ICD-10-CM

## 2025-08-14 DIAGNOSIS — E55.9 VITAMIN D DEFICIENCY: ICD-10-CM

## 2025-08-14 DIAGNOSIS — N18.2 CHRONIC KIDNEY DISEASE, STAGE 2 (MILD): ICD-10-CM

## 2025-08-14 DIAGNOSIS — G30.0 EARLY ONSET ALZHEIMER'S DEMENTIA WITH BEHAVIORAL DISTURBANCE (H): ICD-10-CM

## 2025-08-14 DIAGNOSIS — F10.21 ALCOHOL DEPENDENCE IN REMISSION (H): ICD-10-CM

## 2025-08-14 DIAGNOSIS — E03.4 HYPOTHYROIDISM DUE TO ACQUIRED ATROPHY OF THYROID: ICD-10-CM

## 2025-08-14 DIAGNOSIS — F51.05 INSOMNIA DUE TO OTHER MENTAL DISORDER: ICD-10-CM

## 2025-08-14 DIAGNOSIS — D69.6 THROMBOCYTOPENIA: ICD-10-CM

## 2025-08-14 DIAGNOSIS — E06.3 HASHIMOTO'S THYROIDITIS: ICD-10-CM

## 2025-08-14 DIAGNOSIS — F31.9 BIPOLAR 1 DISORDER (H): ICD-10-CM

## 2025-08-14 DIAGNOSIS — E06.3 HYPOTHYROIDISM DUE TO HASHIMOTO'S THYROIDITIS: ICD-10-CM

## 2025-08-14 DIAGNOSIS — F33.0 MILD EPISODE OF RECURRENT MAJOR DEPRESSIVE DISORDER: Primary | ICD-10-CM

## 2025-08-14 DIAGNOSIS — M15.0 PRIMARY GENERALIZED (OSTEO)ARTHRITIS: ICD-10-CM

## 2025-08-14 DIAGNOSIS — F02.818 EARLY ONSET ALZHEIMER'S DEMENTIA WITH BEHAVIORAL DISTURBANCE (H): ICD-10-CM

## 2025-08-14 DIAGNOSIS — F02.82 DEMENTIA IN OTHER DISEASES CLASSIFIED ELSEWHERE, UNSPECIFIED SEVERITY, WITH PSYCHOTIC DISTURBANCE (H): ICD-10-CM

## 2025-08-21 ENCOUNTER — ASSISTED LIVING VISIT (OUTPATIENT)
Dept: GERIATRICS | Facility: CLINIC | Age: 60
End: 2025-08-21
Payer: MEDICARE

## 2025-08-23 ENCOUNTER — HEALTH MAINTENANCE LETTER (OUTPATIENT)
Age: 60
End: 2025-08-23

## 2025-08-30 PROBLEM — E06.3 HYPOTHYROIDISM DUE TO HASHIMOTO THYROIDITIS: Status: ACTIVE | Noted: 2025-08-30

## 2025-08-30 PROBLEM — F02.82: Status: ACTIVE | Noted: 2025-08-30

## 2025-08-30 PROBLEM — F22 PARANOID DELUSION (H): Status: ACTIVE | Noted: 2025-08-30

## 2025-08-30 PROBLEM — G31.09 FRONTOTEMPORAL DEMENTIA (H): Status: ACTIVE | Noted: 2025-08-30

## 2025-08-30 PROBLEM — F02.80 FRONTOTEMPORAL DEMENTIA (H): Status: ACTIVE | Noted: 2025-08-30
